# Patient Record
Sex: MALE | Race: BLACK OR AFRICAN AMERICAN | Employment: FULL TIME | URBAN - METROPOLITAN AREA
[De-identification: names, ages, dates, MRNs, and addresses within clinical notes are randomized per-mention and may not be internally consistent; named-entity substitution may affect disease eponyms.]

---

## 2020-11-23 ENCOUNTER — OFFICE VISIT (OUTPATIENT)
Dept: FAMILY MEDICINE CLINIC | Facility: CLINIC | Age: 35
End: 2020-11-23
Payer: COMMERCIAL

## 2020-11-23 VITALS
HEIGHT: 73.23 IN | DIASTOLIC BLOOD PRESSURE: 84 MMHG | SYSTOLIC BLOOD PRESSURE: 130 MMHG | HEART RATE: 74 BPM | WEIGHT: 315 LBS | RESPIRATION RATE: 18 BRPM | BODY MASS INDEX: 41.3 KG/M2

## 2020-11-23 DIAGNOSIS — I10 ESSENTIAL HYPERTENSION: ICD-10-CM

## 2020-11-23 DIAGNOSIS — Z00.00 ROUTINE PHYSICAL EXAMINATION: Primary | ICD-10-CM

## 2020-11-23 DIAGNOSIS — Z13.29 THYROID DISORDER SCREEN: ICD-10-CM

## 2020-11-23 DIAGNOSIS — E11.9 TYPE 2 DIABETES MELLITUS WITHOUT COMPLICATION, WITHOUT LONG-TERM CURRENT USE OF INSULIN (HCC): ICD-10-CM

## 2020-11-23 DIAGNOSIS — Z11.3 ROUTINE SCREENING FOR STI (SEXUALLY TRANSMITTED INFECTION): ICD-10-CM

## 2020-11-23 PROCEDURE — 99385 PREV VISIT NEW AGE 18-39: CPT | Performed by: FAMILY MEDICINE

## 2020-11-23 PROCEDURE — 3075F SYST BP GE 130 - 139MM HG: CPT | Performed by: FAMILY MEDICINE

## 2020-11-23 PROCEDURE — 3008F BODY MASS INDEX DOCD: CPT | Performed by: FAMILY MEDICINE

## 2020-11-23 PROCEDURE — 3079F DIAST BP 80-89 MM HG: CPT | Performed by: FAMILY MEDICINE

## 2020-11-23 RX ORDER — HYDROCHLOROTHIAZIDE 25 MG/1
25 TABLET ORAL DAILY
COMMUNITY
Start: 2020-10-28 | End: 2021-12-13

## 2020-11-23 RX ORDER — LOSARTAN POTASSIUM 100 MG/1
100 TABLET ORAL DAILY
COMMUNITY
Start: 2020-10-28 | End: 2021-12-13

## 2020-11-23 RX ORDER — SITAGLIPTIN 50 MG/1
50 TABLET, FILM COATED ORAL DAILY
COMMUNITY
Start: 2020-11-21 | End: 2021-12-13

## 2020-11-23 NOTE — PROGRESS NOTES
HPI:    Patient ID: Navin Jackson is a 38year old male.     47-year-old -American male here for complete preventive care physical and for status update on any confirmed chronic medical illnesses and follow up on any previous labs or procedures examination  Performed. 2. Routine screening for STI (sexually transmitted infection)  Screened by request; asymptomatic.  - T PALLIDUM SCREENING CASCADE  - CHLAMYDIA (NISSERIA) GONORRHOEAE DNA, SDA  - HEPATITIS PANEL, ACUTE (4)  - HIV AG AB COMBO    3.

## 2020-11-24 ENCOUNTER — TELEPHONE (OUTPATIENT)
Dept: INTERNAL MEDICINE CLINIC | Facility: CLINIC | Age: 35
End: 2020-11-24

## 2020-11-24 NOTE — TELEPHONE ENCOUNTER
FMLA forms dropped off at the [facility] office, scanned to the [employer] dept and placed in the [facility] forms box.

## 2020-11-25 NOTE — TELEPHONE ENCOUNTER
FMLA forms have not been received in Forms Dept.  Sent email to [facility] to send forms

## 2020-12-17 NOTE — TELEPHONE ENCOUNTER
Dr Michelle Garcia,    Pt seeking intermittent fmla for high blood pressure and diabetes. He is seeking 1-3 flare ups per month lasting a full day. He is seeking 1 appt every 2 or 3 months. Start date for fmla 12/17/2020.

## 2020-12-17 NOTE — TELEPHONE ENCOUNTER
Dr. Alta Romo,    **Pt seeking intermittent fmla for high blood pressure and diabetes. He is seeking 1-3 flare ups per month lasting a full day. He is seeking 1 appt every 2 or 3 months.  If you approve, please brii below**     Please sign off on form: Fmla

## 2021-02-28 NOTE — LETTER
6/6/2022              [address] [place] [address] [id number]         To Whom it may concern: This is to certify that Dipika Rene had an appointment on 6/6/2022 at 3:40 PM with Will Lewis DO.         Sincerely,          Will Lewis DO  [address] [facility], [address] [facility], [address] [facility]  [address] [facility]  [phone] Discharge Instructions    Discharged to home by car with relative. Discharged via wheelchair, hospital escort: Yes.  Special equipment needed: Not Applicable    Be sure to schedule a follow-up appointment with your primary care doctor or any specialists as instructed.     Discharge Plan:   Diet Plan: Discussed  Activity Level: Discussed  Confirmed Follow up Appointment: Patient to Call and Schedule Appointment  Confirmed Symptoms Management: Discussed  Medication Reconciliation Updated: Yes  Influenza Vaccine Indication: Not indicated: Previously immunized this influenza season and > 8 years of age    I understand that a diet low in cholesterol, fat, and sodium is recommended for good health. Unless I have been given specific instructions below for another diet, I accept this instruction as my diet prescription.   Other diet: Cardiac    Special Instructions: None    · Is patient discharged on Warfarin / Coumadin?   No     Depression / Suicide Risk    As you are discharged from this [facility] facility, it is important to learn how to keep safe from harming yourself.    Recognize the warning signs:  · Abrupt changes in personality, positive or negative- including increase in energy   · Giving away possessions  · Change in eating patterns- significant weight changes-  positive or negative  · Change in sleeping patterns- unable to sleep or sleeping all the time   · Unwillingness or inability to communicate  · Depression  · Unusual sadness, discouragement and loneliness  · Talk of wanting to die  · Neglect of personal appearance   · Rebelliousness- reckless behavior  · Withdrawal from people/activities they love  · Confusion- inability to concentrate     If you or a loved one observes any of these behaviors or has concerns about self-harm, here's what you can do:  · Talk about it- your feelings and reasons for harming yourself  · Remove any means that you might use to hurt yourself (examples: pills, rope, extension  "cords, firearm)  · Get professional help from the community (Mental Health, Substance Abuse, psychological counseling)  · Do not be alone:Call your Safe Contact- someone whom you trust who will be there for you.  · Call your local CRISIS HOTLINE 611-0157 or 697-022-8789  · Call your local Children's Mobile Crisis Response Team [place] (873) 824-5171 or www.Freebee  · Call the toll free National Suicide Prevention Hotlines   · National Suicide Prevention Lifeline 849-622-OLQJ (3921)  · [facility] Line Network 800-SUICIDE (044-9590)      Managing Your Hypertension  Hypertension is commonly called high blood pressure. This is when the force of your blood pressing against the walls of your arteries is too strong. Arteries are blood vessels that carry blood from your heart throughout your body. Hypertension forces the heart to work harder to pump blood, and may cause the arteries to become narrow or stiff. Having untreated or uncontrolled hypertension can cause heart attack, stroke, kidney disease, and other problems.  What are blood pressure readings?  A blood pressure reading consists of a higher number over a lower number. Ideally, your blood pressure should be below 120/80. The first (\"top\") number is called the systolic pressure. It is a measure of the pressure in your arteries as your heart beats. The second (\"bottom\") number is called the diastolic pressure. It is a measure of the pressure in your arteries as the heart relaxes.  What does my blood pressure reading mean?  Blood pressure is classified into four stages. Based on your blood pressure reading, your health care provider may use the following stages to determine what type of treatment you need, if any. Systolic pressure and diastolic pressure are measured in a unit called mm Hg.  Normal  · Systolic pressure: below 120.  · Diastolic pressure: below 80.  Elevated  · Systolic pressure: 120-129.  · Diastolic pressure: below 80.  Hypertension " stage 1  · Systolic pressure: 130-139.  · Diastolic pressure: 80-89.  Hypertension stage 2  · Systolic pressure: 140 or above.  · Diastolic pressure: 90 or above.  What health risks are associated with hypertension?  Managing your hypertension is an important responsibility. Uncontrolled hypertension can lead to:  · A heart attack.  · A stroke.  · A weakened blood vessel (aneurysm).  · Heart failure.  · Kidney damage.  · Eye damage.  · Metabolic syndrome.  · Memory and concentration problems.  What changes can I make to manage my hypertension?  Hypertension can be managed by making lifestyle changes and possibly by taking medicines. Your health care provider will help you make a plan to bring your blood pressure within a normal range.  Eating and drinking    · Eat a diet that is high in fiber and potassium, and low in salt (sodium), added sugar, and fat. An example eating plan is called the DASH (Dietary Approaches to Stop Hypertension) diet. To eat this way:  ? Eat plenty of fresh fruits and vegetables. Try to fill half of your plate at each meal with fruits and vegetables.  ? Eat whole grains, such as whole wheat pasta, brown rice, or whole grain bread. Fill about one quarter of your plate with whole grains.  ? Eat low-fat diary products.  ? Avoid fatty cuts of meat, processed or cured meats, and poultry with skin. Fill about one quarter of your plate with lean proteins such as fish, chicken without skin, beans, eggs, and tofu.  ? Avoid premade and processed foods. These tend to be higher in sodium, added sugar, and fat.  · Reduce your daily sodium intake. Most people with hypertension should eat less than 1,500 mg of sodium a day.  · Limit alcohol intake to no more than 1 drink a day for nonpregnant women and 2 drinks a day for men. One drink equals 12 oz of beer, 5 oz of wine, or 1½ oz of hard liquor.  Lifestyle  · Work with your health care provider to maintain a healthy body weight, or to lose weight. Ask what  an ideal weight is for you.  · Get at least 30 minutes of exercise that causes your heart to beat faster (aerobic exercise) most days of the week. Activities may include walking, swimming, or biking.  · Include exercise to strengthen your muscles (resistance exercise), such as weight lifting, as part of your weekly exercise routine. Try to do these types of exercises for 30 minutes at least 3 days a week.  · Do not use any products that contain nicotine or tobacco, such as cigarettes and e-cigarettes. If you need help quitting, ask your health care provider.  · Control any long-term (chronic) conditions you have, such as high cholesterol or diabetes.  Monitoring  · Monitor your blood pressure at home as told by your health care provider. Your personal target blood pressure may vary depending on your medical conditions, your age, and other factors.  · Have your blood pressure checked regularly, as often as told by your health care provider.  Working with your health care provider  · Review all the medicines you take with your health care provider because there may be side effects or interactions.  · Talk with your health care provider about your diet, exercise habits, and other lifestyle factors that may be contributing to hypertension.  · Visit your health care provider regularly. Your health care provider can help you create and adjust your plan for managing hypertension.  Will I need medicine to control my blood pressure?  Your health care provider may prescribe medicine if lifestyle changes are not enough to get your blood pressure under control, and if:  · Your systolic blood pressure is 130 or higher.  · Your diastolic blood pressure is 80 or higher.  Take medicines only as told by your health care provider. Follow the directions carefully. Blood pressure medicines must be taken as prescribed. The medicine does not work as well when you skip doses. Skipping doses also puts you at risk for problems.  Contact a  health care provider if:  · You think you are having a reaction to medicines you have taken.  · You have repeated (recurrent) headaches.  · You feel dizzy.  · You have swelling in your ankles.  · You have trouble with your vision.  Get help right away if:  · You develop a severe headache or confusion.  · You have unusual weakness or numbness, or you feel faint.  · You have severe pain in your chest or abdomen.  · You vomit repeatedly.  · You have trouble breathing.  Summary  · Hypertension is when the force of blood pumping through your arteries is too strong. If this condition is not controlled, it may put you at risk for serious complications.  · Your personal target blood pressure may vary depending on your medical conditions, your age, and other factors. For most people, a normal blood pressure is less than 120/80.  · Hypertension is managed by lifestyle changes, medicines, or both. Lifestyle changes include weight loss, eating a healthy, low-sodium diet, exercising more, and limiting alcohol.  This information is not intended to replace advice given to you by your health care provider. Make sure you discuss any questions you have with your health care provider.  Document Released: 09/11/2013 Document Revised: 04/10/2020 Document Reviewed: 11/15/2017  Elsevier Patient Education © 2020 [employer] Inc[employer]

## 2021-03-18 DIAGNOSIS — Z23 NEED FOR VACCINATION: ICD-10-CM

## 2021-12-13 ENCOUNTER — PATIENT MESSAGE (OUTPATIENT)
Dept: FAMILY MEDICINE CLINIC | Facility: CLINIC | Age: 36
End: 2021-12-13

## 2021-12-13 RX ORDER — LOSARTAN POTASSIUM 100 MG/1
100 TABLET ORAL DAILY
Qty: 90 TABLET | Refills: 1 | Status: SHIPPED | OUTPATIENT
Start: 2021-12-13

## 2021-12-13 RX ORDER — SITAGLIPTIN 50 MG/1
50 TABLET, FILM COATED ORAL DAILY
Qty: 90 TABLET | Refills: 1 | Status: SHIPPED | OUTPATIENT
Start: 2021-12-13

## 2021-12-13 RX ORDER — HYDROCHLOROTHIAZIDE 25 MG/1
25 TABLET ORAL DAILY
Qty: 90 TABLET | Refills: 1 | Status: SHIPPED | OUTPATIENT
Start: 2021-12-13

## 2021-12-13 NOTE — TELEPHONE ENCOUNTER
Hypertensive Medications  Protocol Criteria:  · Appointment scheduled in the past 6 months or in the next 3 months  · BMP or CMP in the past 12 months  · Creatinine result < 2  Recent Outpatient Visits            1 year ago Routine physical examination

## 2021-12-13 NOTE — TELEPHONE ENCOUNTER
Left message to call back. All rx printed. Attempted to contact patient to inform if she would want rx's mailed and or faxed to pharmacy.

## 2021-12-13 NOTE — TELEPHONE ENCOUNTER
From: Janak Alejandra  To: Theresa Henning DO  Sent: 12/13/2021 8:34 AM CST  Subject: Refill request     Jose Diaz, I currently need a refill on my medications.

## 2022-05-27 ENCOUNTER — TELEPHONE (OUTPATIENT)
Dept: FAMILY MEDICINE CLINIC | Facility: CLINIC | Age: 37
End: 2022-05-27

## 2022-06-06 ENCOUNTER — OFFICE VISIT (OUTPATIENT)
Dept: FAMILY MEDICINE CLINIC | Facility: CLINIC | Age: 37
End: 2022-06-06
Payer: COMMERCIAL

## 2022-06-06 VITALS
WEIGHT: 314.5 LBS | BODY MASS INDEX: 41.23 KG/M2 | HEIGHT: 73.25 IN | DIASTOLIC BLOOD PRESSURE: 100 MMHG | SYSTOLIC BLOOD PRESSURE: 150 MMHG | HEART RATE: 98 BPM

## 2022-06-06 DIAGNOSIS — Z00.00 ROUTINE PHYSICAL EXAMINATION: Primary | ICD-10-CM

## 2022-06-06 DIAGNOSIS — Z13.29 THYROID DISORDER SCREEN: ICD-10-CM

## 2022-06-06 DIAGNOSIS — I10 ESSENTIAL HYPERTENSION: ICD-10-CM

## 2022-06-06 DIAGNOSIS — E11.9 TYPE 2 DIABETES MELLITUS WITHOUT COMPLICATION, WITHOUT LONG-TERM CURRENT USE OF INSULIN (HCC): ICD-10-CM

## 2022-06-06 DIAGNOSIS — E66.01 MORBID OBESITY WITH BMI OF 40.0-44.9, ADULT (HCC): ICD-10-CM

## 2022-06-06 RX ORDER — LOSARTAN POTASSIUM 100 MG/1
100 TABLET ORAL DAILY
Qty: 90 TABLET | Refills: 1 | Status: SHIPPED | OUTPATIENT
Start: 2022-06-06

## 2022-06-06 RX ORDER — HYDROCHLOROTHIAZIDE 25 MG/1
25 TABLET ORAL DAILY
Qty: 90 TABLET | Refills: 1 | Status: SHIPPED | OUTPATIENT
Start: 2022-06-06

## 2022-06-06 RX ORDER — SITAGLIPTIN 50 MG/1
50 TABLET, FILM COATED ORAL DAILY
Qty: 90 TABLET | Refills: 1 | Status: SHIPPED | OUTPATIENT
Start: 2022-06-06

## 2022-06-06 NOTE — PATIENT INSTRUCTIONS
All adult screening ordered and done appropriate for patient's age and gender and risk factors and complaints. Recommend weight loss via daily exercising and consistent healthy dietary changes. Encouraged physical fitness and daily physical activity daily. Comply with medications. Diabetic status update via blood and urine. Patient would also benefit from ophthalmology evaluation just to make sure that there are no damaging effects from the diabetes regarding his eyes. FMLA continuation regarding diabetes.

## 2022-06-08 DIAGNOSIS — E11.9 TYPE 2 DIABETES MELLITUS WITHOUT COMPLICATION, WITHOUT LONG-TERM CURRENT USE OF INSULIN (HCC): ICD-10-CM

## 2022-06-08 DIAGNOSIS — I10 ESSENTIAL HYPERTENSION: ICD-10-CM

## 2022-06-08 DIAGNOSIS — R94.31 ABNORMAL EKG: Primary | ICD-10-CM

## 2022-06-08 NOTE — TELEPHONE ENCOUNTER
Dr. Darcy Zamorano     Please sign off on form: FMLA  -Highlight the patient and hit \"Chart\" button. -In Chart Review, w/in the Encounter tab - click 1 time on the Telephone call encounter for 5/27/22 Scroll down the telephone encounter.  -Click \"scan on\" blue Hyperlink under \"Media\" heading for FMLA Dr Darcy Zamorano 6/7/22  w/in the telephone enc.  -Click on Acknowledge button at the bottom right corner and left-click onto image, signature stamp appears and drag signature to Provider signature line. Stamp will turn blue. Close window.      Thank you,    Mackenzie Craig

## 2022-10-06 ENCOUNTER — MED REC SCAN ONLY (OUTPATIENT)
Dept: INTERNAL MEDICINE CLINIC | Facility: CLINIC | Age: 37
End: 2022-10-06

## 2022-10-28 ENCOUNTER — E-VISIT (OUTPATIENT)
Dept: TELEHEALTH | Age: 37
End: 2022-10-28

## 2022-10-28 DIAGNOSIS — M54.50 ACUTE LOW BACK PAIN, UNSPECIFIED BACK PAIN LATERALITY, UNSPECIFIED WHETHER SCIATICA PRESENT: Primary | ICD-10-CM

## 2023-02-22 PROCEDURE — 3061F NEG MICROALBUMINURIA REV: CPT | Performed by: FAMILY MEDICINE

## 2023-02-23 DIAGNOSIS — E66.01 MORBID OBESITY WITH BMI OF 40.0-44.9, ADULT (HCC): ICD-10-CM

## 2023-02-23 DIAGNOSIS — E13.649 UNCONTROLLED OTHER SPECIFIED DIABETES MELLITUS WITH HYPOGLYCEMIA WITHOUT COMA (HCC): Primary | ICD-10-CM

## 2023-02-23 DIAGNOSIS — I10 PRIMARY HYPERTENSION: ICD-10-CM

## 2023-02-23 LAB
ABSOLUTE BASOPHILS: 21 CELLS/UL (ref 0–200)
ABSOLUTE EOSINOPHILS: 178 CELLS/UL (ref 15–500)
ABSOLUTE LYMPHOCYTES: 1349 CELLS/UL (ref 850–3900)
ABSOLUTE MONOCYTES: 490 CELLS/UL (ref 200–950)
ABSOLUTE NEUTROPHILS: 5062 CELLS/UL (ref 1500–7800)
ALBUMIN/GLOBULIN RATIO: 1.3 (CALC) (ref 1–2.5)
ALBUMIN: 4.3 G/DL (ref 3.6–5.1)
ALKALINE PHOSPHATASE: 93 U/L (ref 36–130)
ALT: 38 U/L (ref 9–46)
AST: 70 U/L (ref 10–40)
BASOPHILS: 0.3 %
BILIRUBIN, TOTAL: 1 MG/DL (ref 0.2–1.2)
BUN: 10 MG/DL (ref 7–25)
CALCIUM: 10.4 MG/DL (ref 8.6–10.3)
CARBON DIOXIDE: 29 MMOL/L (ref 20–32)
CHLORIDE: 96 MMOL/L (ref 98–110)
CHOL/HDLC RATIO: 5.3 (CALC)
CHOLESTEROL, TOTAL: 200 MG/DL
CREATININE, RANDOM URINE: 216 MG/DL (ref 20–320)
CREATININE: 0.95 MG/DL (ref 0.6–1.26)
EGFR: 104 ML/MIN/1.73M2
EOSINOPHILS: 2.5 %
GLOBULIN: 3.3 G/DL (CALC) (ref 1.9–3.7)
GLUCOSE: 128 MG/DL (ref 65–99)
HDL CHOLESTEROL: 38 MG/DL
HEMATOCRIT: 46.6 % (ref 38.5–50)
HEMOGLOBIN A1C: 9.4 % OF TOTAL HGB
HEMOGLOBIN: 16.3 G/DL (ref 13.2–17.1)
LDL-CHOLESTEROL: 135 MG/DL (CALC)
LYMPHOCYTES: 19 %
MCH: 33.1 PG (ref 27–33)
MCHC: 35 G/DL (ref 32–36)
MCV: 94.7 FL (ref 80–100)
MICROALBUMIN/CREATININE RATIO, RANDOM URINE: 5 MCG/MG CREAT
MICROALBUMIN: 1 MG/DL
MONOCYTES: 6.9 %
MPV: 11 FL (ref 7.5–12.5)
NEUTROPHILS: 71.3 %
NON-HDL CHOLESTEROL: 162 MG/DL (CALC)
PLATELET COUNT: 161 THOUSAND/UL (ref 140–400)
POTASSIUM: 4.3 MMOL/L (ref 3.5–5.3)
PROTEIN, TOTAL: 7.6 G/DL (ref 6.1–8.1)
RDW: 12.3 % (ref 11–15)
RED BLOOD CELL COUNT: 4.92 MILLION/UL (ref 4.2–5.8)
SODIUM: 136 MMOL/L (ref 135–146)
TRIGLYCERIDES: 144 MG/DL
WHITE BLOOD CELL COUNT: 7.1 THOUSAND/UL (ref 3.8–10.8)

## 2023-03-15 DIAGNOSIS — E11.9 TYPE 2 DIABETES MELLITUS WITHOUT COMPLICATION, WITHOUT LONG-TERM CURRENT USE OF INSULIN (HCC): ICD-10-CM

## 2023-03-15 NOTE — TELEPHONE ENCOUNTER
Please review. Protocol Failed or has No Protocol.     Requested Prescriptions   Pending Prescriptions Disp Refills    METFORMIN 500 MG Oral Tab [Pharmacy Med Name: METFORMIN  MG TABLET] 60 tablet 2     Sig: TAKE 1 TABLET BY MOUTH TWICE A DAY       Diabetes Medication Protocol Failed - 3/15/2023  1:31 PM        Failed - Last A1C < 7.5 and within past 6 months     Lab Results   Component Value Date    A1C 9.4 (H) 02/22/2023             Failed - In person appointment or virtual visit in the past 6 mos or appointment in next 3 mos     Recent Outpatient Visits              4 months ago Acute low back pain, unspecified back pain laterality, unspecified whether sciatica present    [address], Virtual Visit BRITTANY Clemons    E-Visit    9 months ago Routine physical examination    [address], [facility], [place], [place]    Office Visit    2 years ago Routine physical examination    Ketan Romero [place], [place]    Office Visit                      Failed - GFR in the past 12 months        Passed - EGFRCR or GFRAA > 50     GFR Evaluation                     Recent Outpatient Visits              4 months ago Acute low back pain, unspecified back pain laterality, unspecified whether sciatica present    [address], Virtual Visit BRITTANY Clemons    E-Visit    9 months ago Routine physical examination    [address], [facility], [place], [place],     Office Visit    2 years ago Routine physical examination    [address], [facility], [place], [place]    Office Visit

## 2023-03-29 ENCOUNTER — TELEPHONE (OUTPATIENT)
Dept: FAMILY MEDICINE CLINIC | Facility: CLINIC | Age: 38
End: 2023-03-29

## 2023-05-18 ENCOUNTER — TELEPHONE (OUTPATIENT)
Dept: FAMILY MEDICINE CLINIC | Facility: CLINIC | Age: 38
End: 2023-05-18

## 2023-05-28 DIAGNOSIS — E11.9 TYPE 2 DIABETES MELLITUS WITHOUT COMPLICATION, WITHOUT LONG-TERM CURRENT USE OF INSULIN (HCC): ICD-10-CM

## 2023-05-29 RX ORDER — SITAGLIPTIN 50 MG/1
50 TABLET, FILM COATED ORAL DAILY
Qty: 90 TABLET | Refills: 1 | Status: SHIPPED | OUTPATIENT
Start: 2023-05-29

## 2023-05-29 NOTE — TELEPHONE ENCOUNTER
Please review. Protocol Failed or has No Protocol.     Requested Prescriptions   Pending Prescriptions Disp Refills    JANUVIA 50 MG Oral Tab [Pharmacy Med Name: Shirlene Fish 50 MG TABLET] 90 tablet 1     Sig: TAKE 1 TABLET BY MOUTH EVERY DAY       Diabetes Medication Protocol Failed - 5/28/2023  8:04 AM        Failed - Last A1C < 7.5 and within past 6 months     Lab Results   Component Value Date    A1C 9.4 (H) 02/22/2023               Passed - In person appointment or virtual visit in the past 6 mos or appointment in next 3 mos     Recent Outpatient Visits              7 months ago Acute low back pain, unspecified back pain laterality, unspecified whether sciatica present    lEa Bell, Virtual Visit BRITTANY Danielson    E-Visit    11 months ago Routine physical examination    Florentin Ca, Alma Guerra,     Office Visit    2 years ago Routine physical examination    Florentin Ca, Alma Guerra, [place]    Office Visit          Future Appointments         Provider Department Appt Notes    In 1 week DO Ela Leija [address], [address] or GFRAA > 50     GFR Evaluation              Passed - GFR in the past 12 months             Future Appointments         Provider Department Appt Notes    In 1 week DO Clem Leija, [facility] Outpatient Visits              7 months ago Acute low back pain, unspecified back pain laterality, unspecified whether sciatica present    Ela Bell, Virtual Visit BRITTANY Danielson    E-Visit    11 months ago Routine physical examination    [address], [place], Alma Guerra, DO    Office Visit    2 years ago Routine physical examination    Ela Bell, [address] [place] [address] James Leiva, Amina Resendez, DO    Office Visit

## 2023-06-08 ENCOUNTER — TELEPHONE (OUTPATIENT)
Dept: FAMILY MEDICINE CLINIC | Facility: CLINIC | Age: 38
End: 2023-06-08

## 2023-06-08 ENCOUNTER — OFFICE VISIT (OUTPATIENT)
Dept: FAMILY MEDICINE CLINIC | Facility: CLINIC | Age: 38
End: 2023-06-08

## 2023-06-08 VITALS
BODY MASS INDEX: 41.3 KG/M2 | TEMPERATURE: 98 F | DIASTOLIC BLOOD PRESSURE: 85 MMHG | WEIGHT: 315 LBS | HEIGHT: 73.25 IN | HEART RATE: 80 BPM | SYSTOLIC BLOOD PRESSURE: 110 MMHG

## 2023-06-08 DIAGNOSIS — E11.9 DIABETIC EYE EXAM (HCC): Primary | ICD-10-CM

## 2023-06-08 DIAGNOSIS — E66.01 MORBID OBESITY WITH BMI OF 40.0-44.9, ADULT (HCC): ICD-10-CM

## 2023-06-08 DIAGNOSIS — I10 ESSENTIAL HYPERTENSION: ICD-10-CM

## 2023-06-08 DIAGNOSIS — E11.9 TYPE 2 DIABETES MELLITUS WITHOUT COMPLICATION, WITHOUT LONG-TERM CURRENT USE OF INSULIN (HCC): ICD-10-CM

## 2023-06-08 DIAGNOSIS — Z01.00 DIABETIC EYE EXAM (HCC): Primary | ICD-10-CM

## 2023-06-08 LAB
CARTRIDGE LOT#: 595 NUMERIC
HEMOGLOBIN A1C: 10 % (ref 4.3–5.6)

## 2023-06-08 PROCEDURE — 3008F BODY MASS INDEX DOCD: CPT | Performed by: FAMILY MEDICINE

## 2023-06-08 PROCEDURE — 99214 OFFICE O/P EST MOD 30 MIN: CPT | Performed by: FAMILY MEDICINE

## 2023-06-08 PROCEDURE — 3074F SYST BP LT 130 MM HG: CPT | Performed by: FAMILY MEDICINE

## 2023-06-08 PROCEDURE — 3046F HEMOGLOBIN A1C LEVEL >9.0%: CPT | Performed by: FAMILY MEDICINE

## 2023-06-08 PROCEDURE — 3079F DIAST BP 80-89 MM HG: CPT | Performed by: FAMILY MEDICINE

## 2023-06-08 PROCEDURE — 90471 IMMUNIZATION ADMIN: CPT | Performed by: FAMILY MEDICINE

## 2023-06-08 PROCEDURE — 90677 PCV20 VACCINE IM: CPT | Performed by: FAMILY MEDICINE

## 2023-06-08 PROCEDURE — 83036 HEMOGLOBIN GLYCOSYLATED A1C: CPT | Performed by: FAMILY MEDICINE

## 2023-06-08 NOTE — PATIENT INSTRUCTIONS
Recommend weight loss via daily exercising and consistent healthy dietary changes. Encouraged physical fitness and daily physical activity daily. Medication reviewed and renewed where needed and appropriate. Comply with medications. Monitor blood pressures and record at home. Limit salt intake. Patient being referred to endocrinology for optimization of diabetic treatment. FMLA to be updated regarding hypertensive/diabetic chronic medical illness. Patient has been approved for the initial allotment provided on the initial FMLA form.

## 2023-06-08 NOTE — TELEPHONE ENCOUNTER
Patient came in for Dr encarnacion and to drop off FMLA forms and needs to be billed the $25 fee. Forms were sent to forms department at [email or web address] and originals where kept at [facility] location.

## 2023-06-08 NOTE — TELEPHONE ENCOUNTER
Fmla received in forms dept. Logged for processing. Valid [facility] auth received with forms.

## 2023-06-14 DIAGNOSIS — I10 ESSENTIAL HYPERTENSION: ICD-10-CM

## 2023-06-15 RX ORDER — LOSARTAN POTASSIUM 100 MG/1
100 TABLET ORAL DAILY
Qty: 90 TABLET | Refills: 3 | Status: SHIPPED | OUTPATIENT
Start: 2023-06-15

## 2023-06-15 RX ORDER — HYDROCHLOROTHIAZIDE 25 MG/1
25 TABLET ORAL DAILY
Qty: 90 TABLET | Refills: 3 | Status: SHIPPED | OUTPATIENT
Start: 2023-06-15

## 2023-06-15 NOTE — TELEPHONE ENCOUNTER
Refill passed per [facility] protocol. Requested Prescriptions   Pending Prescriptions Disp Refills    HYDROCHLOROTHIAZIDE 25 MG Oral Tab [Pharmacy Med Name: HYDROCHLOROTHIAZIDE 25 MG TAB] 90 tablet 1     Sig: Take 1 tablet (25 mg total) by mouth daily. Hypertensive Medications Protocol Passed - 6/14/2023 11:01 AM        Passed - In person appointment in the past 12 or next 3 months     Recent Outpatient Visits              1 week ago Diabetic eye exam [facility])    [facility]    [facility] Visit    7 months ago Acute low back pain, unspecified back pain laterality, unspecified whether sciatica present    [address], Virtual Visit BRITTANY Warner    E-Visit    1 year ago Routine physical examination    Florentin Orona Tally Reges,     Office Visit    2 years ago Routine physical examination    Florentin Orona Tally Reges [place]    Office Visit                      Passed - Last BP reading less than 140/90     BP Readings from Last 1 Encounters:  06/08/23 : 110/85              Passed - CMP or BMP in past 6 months     Recent Results (from the past 4392 hour(s))   COMP METABOLIC PANEL (14)    Collection Time: 02/22/23  6:39 AM   Result Value Ref Range    GLUCOSE 128 (H) 65 - 99 mg/dL     Comment:               Fasting reference interval     For someone without known diabetes, a glucose  value >125 mg/dL indicates that they may have  diabetes and this should be confirmed with a  follow-up test.         UREA NITROGEN (BUN) 10 7 - 25 mg/dL    CREATININE 0.95 0.60 - 1.26 mg/dL    EGFR 104 > OR = 60 mL/min/1.73m2     Comment: The eGFR is based on the CKD-EPI 2021 equation. To calculate   the new eGFR from a previous Creatinine or Cystatin C  result, go to [employer]. org/professionals/  kdoqi/[email or web address]      BUN/CREATININE RATIO NOT APPLICABLE 6 - 22 (calc)    SODIUM 136 135 - 146 mmol/L    POTASSIUM 4.3 3.5 - 5.3 mmol/L    CHLORIDE 96 (L) 98 - 110 mmol/L    CARBON DIOXIDE 29 20 - 32 mmol/L    CALCIUM 10.4 (H) 8.6 - 10.3 mg/dL    PROTEIN, TOTAL 7.6 6.1 - 8.1 g/dL    ALBUMIN 4.3 3.6 - 5.1 g/dL    GLOBULIN 3.3 1.9 - 3.7 g/dL (calc)    ALBUMIN/GLOBULIN RATIO 1.3 1.0 - 2.5 (calc)    BILIRUBIN, TOTAL 1.0 0.2 - 1.2 mg/dL    ALKALINE PHOSPHATASE 93 36 - 130 U/L    AST 70 (H) 10 - 40 U/L    ALT 38 9 - 46 U/L     *Note: Due to a large number of results and/or encounters for the requested time period, some results have not been displayed. A complete set of results can be found in Results Review.                Passed - In person appointment or virtual visit in the past 6 months     Recent Outpatient Visits              1 week ago Diabetic eye exam [facility])    Florentin Cuellar Christyne Actis [place]    Office Visit    7 months ago Acute low back pain, unspecified back pain laterality, unspecified whether sciatica present    [address], Virtual Visit BRITTANY Adams    E-Visit    1 year ago Routine physical examination    Florentin Cuellar Christyne Actis,     Office Visit    2 years ago Routine physical examination    Florentin Cuellar Christyne Actis, DO    Office Visit                      Passed - EGFRCR or GFRAA > 50     GFR Evaluation              LOSARTAN 100 MG Oral Tab [Pharmacy Med Name: LOSARTAN POTASSIUM 100 MG TAB] 90 tablet 1     Sig: TAKE 1 TABLET BY MOUTH EVERY DAY       Hypertensive Medications Protocol Passed - 6/14/2023 11:01 AM        Passed - In person appointment in the past 12 or next 3 months     Recent Outpatient Visits              1 week ago Diabetic eye exam [facility])    Florentin Cuellar [place]    Office Visit    7 months ago Acute low back pain, unspecified back pain laterality, unspecified whether sciatica present    [address] [place], Virtual Visit Margareth Jesus Alberto BRITTANY    E-Visit    1 year ago Routine physical examination    [address], [place]    Office Visit    2 years ago Routine physical examination    [address], [place]    Office Visit                      Passed - Last BP reading less than 140/90     BP Readings from Last 1 Encounters:  06/08/23 : 110/85              Passed - CMP or BMP in past 6 months     Recent Results (from the past 4392 hour(s))   COMP METABOLIC PANEL (14)    Collection Time: 02/22/23  6:39 AM   Result Value Ref Range    GLUCOSE 128 (H) 65 - 99 mg/dL     Comment:               Fasting reference interval     For someone without known diabetes, a glucose  value >125 mg/dL indicates that they may have  diabetes and this should be confirmed with a  follow-up test.         UREA NITROGEN (BUN) 10 7 - 25 mg/dL    CREATININE 0.95 0.60 - 1.26 mg/dL    EGFR 104 > OR = 60 mL/min/1.73m2     Comment: The eGFR is based on the CKD-EPI 2021 equation. To calculate   the new eGFR from a previous Creatinine or Cystatin C  result, go to [employer]. org/professionals/  kdoqi/gfr%5Fcalculator      BUN/CREATININE RATIO NOT APPLICABLE 6 - 22 (calc)    SODIUM 136 135 - 146 mmol/L    POTASSIUM 4.3 3.5 - 5.3 mmol/L    CHLORIDE 96 (L) 98 - 110 mmol/L    CARBON DIOXIDE 29 20 - 32 mmol/L    CALCIUM 10.4 (H) 8.6 - 10.3 mg/dL    PROTEIN, TOTAL 7.6 6.1 - 8.1 g/dL    ALBUMIN 4.3 3.6 - 5.1 g/dL    GLOBULIN 3.3 1.9 - 3.7 g/dL (calc)    ALBUMIN/GLOBULIN RATIO 1.3 1.0 - 2.5 (calc)    BILIRUBIN, TOTAL 1.0 0.2 - 1.2 mg/dL    ALKALINE PHOSPHATASE 93 36 - 130 U/L    AST 70 (H) 10 - 40 U/L    ALT 38 9 - 46 U/L     *Note: Due to a large number of results and/or encounters for the requested time period, some results have not been displayed. A complete set of results can be found in Results Review.                Passed - In person appointment or virtual visit in the past 6 months     Recent Outpatient Visits              1 week ago Diabetic eye exam [facility])    [facility] [place], [place]    Office Visit    7 months ago Acute low back pain, unspecified back pain laterality, unspecified whether sciatica present    [employer], Virtual Visit BRITTANY Borges    E-Visit    1 year ago [facility] physical [facility]    [facility]    2 years ago Routine physical examination    [facility] [place], [address] [place] Final or GFRAA > 50     GFR Evaluation                 Recent Outpatient Visits              1 week ago Diabetic eye exam [facility])    [facility], [place], [place]    Office Visit    7 months ago Acute low back pain, unspecified back pain laterality, unspecified whether sciatica present    [employer], Virtual Visit BRITTANY Borges    E-Visit    1 year ago Routine physical examination    [facility] [place]    Office Visit    2 years ago Routine physical examination    [facility]    Office Visit

## 2023-06-22 NOTE — TELEPHONE ENCOUNTER
Dr Dipti Martinez,    Pt is seeking intermittent fmla due to high blood pressure and diabetes. Pt is seeking 1-5 flare ups a month, each flare up lasting 1 day. Do you support?     Thanks,     Luda Pac

## 2023-06-23 NOTE — TELEPHONE ENCOUNTER
----- Message from Librado Smart MD sent at 7/28/2021  6:39 AM CDT -----  LFTs somewhat improved though still concerning.  Most GI labs still pending at this time though awaiting their recommendations pending results.  Continue to hold cholesterol medication, avoid tylenol, alcohol in particular.   Yes I support this. Thank you.

## 2023-07-03 NOTE — TELEPHONE ENCOUNTER
Dr. Cali Blount     Please sign off on form if you agree to: FMLA due to high blood pressure and diabetes   (Please place your signature on the first page only)    -From your Inbasket, Highlight the patient and click Chart   -Double click the 08/36/4102 Forms Completion telephone encounter  -Scroll down to the Media section   -Click the blue Hyperlink: FMLA Dr Cali Blount [id number]/[id number]  -Click Acknowledge located at the bottom right corner (if you do not see acknowledge, try maximizing your window)   -Drag the mouse into the blank space of the document and a + sign will appear. Left click to   electronically sign the document.      Thank you,    Leah Garay

## 2023-07-05 NOTE — TELEPHONE ENCOUNTER
FMLA forms faxed to [employer] [phone]. Confirmation received, [employer] message sent.

## 2023-09-11 DIAGNOSIS — E11.9 TYPE 2 DIABETES MELLITUS WITHOUT COMPLICATION, WITHOUT LONG-TERM CURRENT USE OF INSULIN (HCC): ICD-10-CM

## 2023-09-11 NOTE — TELEPHONE ENCOUNTER
Please review. Protocol failed / Has no protocol.      Requested Prescriptions   Pending Prescriptions Disp Refills    METFORMIN 500 MG Oral Tab [Pharmacy Med Name: METFORMIN  MG TABLET] 180 tablet 0     Sig: TAKE 1 TABLET BY MOUTH TWICE A DAY       Diabetes Medication Protocol Failed - 9/11/2023 12:54 AM        Failed - Last A1C < 7.5 and within past 6 months     Lab Results   Component Value Date    A1C 10.0 (A) 06/08/2023             Passed - In person appointment or virtual visit in the past 6 mos or appointment in next 3 mos     Recent Outpatient Visits              3 months ago Diabetic eye exam [facility])    [address] [facility], [address], [place]    Office Visit    10 months ago Acute low back pain, unspecified back pain laterality, unspecified whether sciatica present    [address], Virtual Visit BRITTANY Valentine    E-Visit    1 year ago Routine physical examination    [address] [facility], [address], [place]    Office Visit    2 years ago Routine physical examination    [address] [place] [address], [address], [place]    Office Visit                      Passed - EGFRCR or GFRAA > 50     GFR Evaluation  EGFRCR: 104 , resulted on 2/22/2023          Passed - GFR in the past 12 months              Recent Outpatient Visits              3 months ago Diabetic eye exam [facility])    [address] [facility], [address], [place]    Office Visit    10 months ago Acute low back pain, unspecified back pain laterality, unspecified whether sciatica present    [address], Virtual Visit BRITTANY Valentine    E-Visit    1 year ago Routine physical examination    [address], [address], [address], [place]    Office Visit    2 years ago Routine physical examination    [address], [address] [place] [address] Mikhail Dean, Susanne Solomon, DO    Office Visit

## 2023-10-20 ENCOUNTER — TELEPHONE (OUTPATIENT)
Dept: ENDOCRINOLOGY | Facility: HOSPITAL | Age: 38
End: 2023-10-20

## 2024-05-04 DIAGNOSIS — E11.9 TYPE 2 DIABETES MELLITUS WITHOUT COMPLICATION, WITHOUT LONG-TERM CURRENT USE OF INSULIN (HCC): ICD-10-CM

## 2024-05-06 NOTE — TELEPHONE ENCOUNTER
Please review; protocol failed/No Protocol    Last Office Visit: 06/08/2023  Sent [employer] message to patient to schedule an appointment.     No Active/Future labs pended.     Requested Prescriptions   Pending Prescriptions Disp Refills    METFORMIN 500 MG Oral Tab [Pharmacy Med Name: METFORMIN  MG TABLET] 180 tablet 0     Sig: TAKE 1 TABLET BY MOUTH TWICE A DAY       Diabetes Medication Protocol Failed - 5/4/2024 10:42 AM        Failed - Last A1C < 7.5 and within past 6 months     Lab Results   Component Value Date    A1C 10.0 (A) 06/08/2023             Failed - In person appointment or virtual visit in the past 6 mos or appointment in next 3 mos     Recent Outpatient Visits              11 months ago Diabetic eye exam (HCC)    [facility], [facility] [place] [place], [place]    Office Visit    1 year ago Acute low back pain, unspecified back pain laterality, unspecified whether sciatica present    [facility], Virtual Visit Loren Oneill APRN    E-Visit    1 year ago Routine physical examination    [facility] [place] [place], [place]    Office Visit    3 years ago Routine physical examination    [facility], [facility] [place] Francis Rose, DO    Office Visit                      Failed - EGFRCR or GFRAA > 50     GFR Evaluation            Failed - GFR in the past 12 months        Passed - Microalbumin procedure in past 12 months or taking ACE/ARB          JANUVIA 50 MG Oral Tab [Pharmacy Med Name: JANUVIA 50 MG TABLET] 90 tablet 1     Sig: TAKE 1 TABLET BY MOUTH EVERY DAY       Diabetes Medication Protocol Failed - 5/4/2024 10:42 AM        Failed - Last A1C < 7.5 and within past 6 months     Lab Results   Component Value Date    A1C 10.0 (A) 06/08/2023             Failed - In person appointment or virtual visit in the past 6 mos or appointment in next 3 mos     Recent Outpatient Visits               11 months ago Diabetic eye exam (HCC)    [facility], [facility] Francis Rose, DO    Office Visit    1 year ago Acute low back pain, unspecified back pain laterality, unspecified whether sciatica present    [facility], Virtual Visit Loren Oneill APRN    E-Visit    1 year ago Routine physical examination    [facility], [facility] Francis Rose, DO    Office Visit    3 years ago Routine physical examination    [facility], [facility] [place] Francis Rose, DO    Office Visit                      Failed - EGFRCR or GFRAA > 50     GFR Evaluation            Failed - GFR in the past 12 months        Passed - Microalbumin procedure in past 12 months or taking ACE/ARB             Recent Outpatient Visits              11 months ago Diabetic eye exam (HCC)    [facility], [facility] EwenFrancis Eden, DO    Office Visit    1 year ago Acute low back pain, unspecified back pain laterality, unspecified whether sciatica present    [facility], Virtual Visit Loren Oneill APRN    E-Visit    1 year ago Routine physical examination    [facility], [facility] Francis Eden, DO    Office Visit    3 years ago Routine physical examination    [facility], [facility] [place] Francis Rose, DO    Office Visit

## 2024-07-10 ENCOUNTER — TELEPHONE (OUTPATIENT)
Dept: FAMILY MEDICINE CLINIC | Facility: CLINIC | Age: 39
End: 2024-07-10

## 2024-07-10 NOTE — TELEPHONE ENCOUNTER
Patient called stating his intermittent Family Medical Leave Act is due.  All details the same as previous.  Start date 7/10/24 - 7/10/25.  Patient to fax new form.

## 2024-08-08 NOTE — TELEPHONE ENCOUNTER
Patient called stating he has Family Medical Leave Act forms and plans to submit them in office today.  Informed patient of current turn around time.  Same details as last form completed.  Patient has appointment on 8/27/24 with provider.

## 2024-08-14 ENCOUNTER — HOSPITAL ENCOUNTER (OUTPATIENT)
Age: 39
Discharge: HOME | End: 2024-08-14
Payer: COMMERCIAL

## 2024-08-14 VITALS
TEMPERATURE: 99 F | HEART RATE: 92 BPM | RESPIRATION RATE: 20 BRPM | DIASTOLIC BLOOD PRESSURE: 72 MMHG | OXYGEN SATURATION: 100 % | SYSTOLIC BLOOD PRESSURE: 105 MMHG

## 2024-08-14 DIAGNOSIS — J06.9 VIRAL UPPER RESPIRATORY TRACT INFECTION: Primary | ICD-10-CM

## 2024-08-14 DIAGNOSIS — Z20.822 LAB TEST NEGATIVE FOR COVID-19 VIRUS: ICD-10-CM

## 2024-08-14 DIAGNOSIS — Z20.822 ENCOUNTER FOR LABORATORY TESTING FOR COVID-19 VIRUS: ICD-10-CM

## 2024-08-14 LAB — SARS-COV-2 RNA RESP QL NAA+PROBE: NOT DETECTED

## 2024-08-14 PROCEDURE — U0002 COVID-19 LAB TEST NON-CDC: HCPCS | Performed by: NURSE PRACTITIONER

## 2024-08-14 PROCEDURE — 99213 OFFICE O/P EST LOW 20 MIN: CPT | Performed by: NURSE PRACTITIONER

## 2024-08-14 NOTE — ED PROVIDER NOTES
Patient Seen in: [facility]      History     Chief Complaint   Patient presents with    Cough/URI    Sore Throat     Stated Complaint: sore throat    Subjective:   Well-appearing 39-year-old male with diabetes and hypertension presents with complaints of a nonproductive cough, sore throat and nasal congestion for the past 6 days.  Patient communicates that he was unable to work this past Saturday due to cold symptoms, and is now requesting a work note.  Patient communicates that symptoms have improved.  Patient denies fever or chills.  Patient denies chest pain or shortness of breath.  No over-the-counter medications have been taken for symptoms.        Objective:   History reviewed. No pertinent past medical history.           History reviewed. No pertinent surgical history.             Social History     Socioeconomic History    Marital status: Single   Tobacco Use    Smoking status: Every Day     Types: Cigarettes    Smokeless tobacco: Never   Vaping Use    Vaping status: Never Used   Substance and Sexual Activity    Alcohol use: Yes    Drug use: Not Currently              Review of Systems    Positive for stated Chief Complaint: Cough/URI and Sore Throat    Other systems are as noted in HPI.  Constitutional and vital signs reviewed.      All other systems reviewed and negative except as noted above.    Physical Exam     ED Triage Vitals [08/14/24 1346]   /72   Pulse 103   Resp 20   Temp 98.8 °F (37.1 °C)   Temp src Temporal   SpO2 100 %   O2 Device None (Room air)       Current Vitals:   Vital Signs  BP: 105/72  Pulse: 92  Resp: 20  Temp: 98.8 °F (37.1 °C)  Temp src: Temporal    Oxygen Therapy  SpO2: 100 %  O2 Device: None (Room air)        Physical Exam  VS: Vital signs reviewed. 02 saturation within normal limits for this patient.    General: Patient is awake and alert, oriented to person, place and time. Pt appears non-toxic.     HEENT: Head is normocephalic, atraumatic. Nonicteric  sclera, no conjunctival injection. No facial droop or slurred speech. No oral lesions or pallor. Mucous membranes moist.      Right Ear: Tympanic membrane, ear canal and external ear normal.      Left Ear: Tympanic membrane, ear canal and external ear normal.      Nose: Congestion present. No rhinorrhea.      Mouth/Throat:      Lips: Pink.      Mouth: Mucous membranes are moist.      Pharynx: Oropharynx is clear.     Neck: No cervical lymphadenopathy. Supple. Normal ROM.    Heart: Regular rate and rhythm, normal S1, normal S2.    Lungs: Clear to auscultation. Good inspiratory effort. + Airway entry bilaterally without wheezes, rhonchi or crackles. No accessory muscle use or tachypnea.    Extremities: No focal swelling or tenderness. Capillary refill noted.     Skin: Warm, dry and normal in color.     Psychiatric: Normal affect, judgement normal, insight normal.     CNS: Moves all 4 extremities. Interacts appropriately. No gait abnormality. Memory normal.        ED Course     Labs Reviewed   RAPID SARS-COV-2 BY PCR - Normal     MDM     Medical Decision Making  Well-appearing.  Rapid COVID-19 PCR not detected.  Work note was provided for today, can return tomorrow.  Patient communicates that symptoms are improving, I discussed over-the-counter supportive care as needed.  Close PMD follow-up as well as return precautions discussed.    Problems Addressed:  Encounter for laboratory testing for COVID-19 virus: acute illness or injury  Lab test negative for COVID-19 virus: acute illness or injury  Viral upper respiratory tract infection: acute illness or injury    Amount and/or Complexity of Data Reviewed  Labs: ordered. Decision-making details documented in ED Course.        Disposition and Plan     Clinical Impression:  1. Viral upper respiratory tract infection    2. Encounter for laboratory testing for COVID-19 virus    3. Lab test negative for COVID-19 virus         Disposition:  Discharge  8/14/2024  2:17  pm    Follow-up:  Francis Rose, DO  [address] [facility] [address]  [facility] [address]  [phone]    In 1 week  As needed          Medications Prescribed:  Discharge Medication List as of 8/14/2024  2:19 PM

## 2024-08-22 NOTE — TELEPHONE ENCOUNTER
Patient called, requesting status, informed patient form can be completed after 8/27/24 with provider.  Patient acknowledged.

## 2024-08-27 ENCOUNTER — LAB ENCOUNTER (OUTPATIENT)
Dept: LAB | Age: 39
End: 2024-08-27
Attending: FAMILY MEDICINE
Payer: COMMERCIAL

## 2024-08-27 ENCOUNTER — NURSE ONLY (OUTPATIENT)
Dept: INTERNAL MEDICINE CLINIC | Facility: CLINIC | Age: 39
End: 2024-08-27

## 2024-08-27 DIAGNOSIS — Z00.00 ROUTINE PHYSICAL EXAMINATION: ICD-10-CM

## 2024-08-27 DIAGNOSIS — Z11.3 ROUTINE SCREENING FOR STI (SEXUALLY TRANSMITTED INFECTION): ICD-10-CM

## 2024-08-27 DIAGNOSIS — E11.9 TYPE 2 DIABETES MELLITUS WITHOUT COMPLICATION, WITHOUT LONG-TERM CURRENT USE OF INSULIN (HCC): ICD-10-CM

## 2024-08-27 LAB
ALBUMIN SERPL-MCNC: 4.2 G/DL (ref 3.2–4.8)
ALBUMIN/GLOB SERPL: 1.1 {RATIO} (ref 1–2)
ALP LIVER SERPL-CCNC: 85 U/L
ALT SERPL-CCNC: 45 U/L
ANION GAP SERPL CALC-SCNC: 11 MMOL/L (ref 0–18)
AST SERPL-CCNC: 65 U/L (ref ?–34)
BASOPHILS # BLD AUTO: 0.04 X10(3) UL (ref 0–0.2)
BASOPHILS NFR BLD AUTO: 0.9 %
BILIRUB SERPL-MCNC: 0.6 MG/DL (ref 0.3–1.2)
BILIRUB UR QL: NEGATIVE
BUN BLD-MCNC: 7 MG/DL (ref 9–23)
BUN/CREAT SERPL: 5.4 (ref 10–20)
CALCIUM BLD-MCNC: 9.7 MG/DL (ref 8.7–10.4)
CHLORIDE SERPL-SCNC: 101 MMOL/L (ref 98–112)
CHOLEST SERPL-MCNC: 176 MG/DL (ref ?–200)
CLARITY UR: CLEAR
CO2 SERPL-SCNC: 25 MMOL/L (ref 21–32)
COLOR UR: YELLOW
COMPLEXED PSA SERPL-MCNC: 2.13 NG/ML (ref ?–4)
CREAT BLD-MCNC: 1.3 MG/DL
CREAT UR-SCNC: 172.6 MG/DL
DEPRECATED RDW RBC AUTO: 41 FL (ref 35.1–46.3)
EGFRCR SERPLBLD CKD-EPI 2021: 71 ML/MIN/1.73M2 (ref 60–?)
EOSINOPHIL # BLD AUTO: 0.19 X10(3) UL (ref 0–0.7)
EOSINOPHIL NFR BLD AUTO: 4.1 %
ERYTHROCYTE [DISTWIDTH] IN BLOOD BY AUTOMATED COUNT: 11.9 % (ref 11–15)
FASTING PATIENT LIPID ANSWER: NO
FASTING STATUS PATIENT QL REPORTED: NO
GLOBULIN PLAS-MCNC: 3.7 G/DL (ref 2–3.5)
GLUCOSE BLD-MCNC: 170 MG/DL (ref 70–99)
GLUCOSE UR-MCNC: NORMAL MG/DL
HAV AB SER QL IA: REACTIVE
HAV IGM SER QL: NONREACTIVE
HBV CORE AB SERPL QL IA: NONREACTIVE
HBV SURFACE AB SER QL: REACTIVE
HBV SURFACE AB SERPL IA-ACNC: 27.29 MIU/ML
HBV SURFACE AG SERPL QL IA: NONREACTIVE
HCT VFR BLD AUTO: 40.6 %
HCV AB SERPL QL IA: NONREACTIVE
HDLC SERPL-MCNC: 35 MG/DL (ref 40–59)
HGB BLD-MCNC: 14.7 G/DL
HGB UR QL STRIP.AUTO: NEGATIVE
IMM GRANULOCYTES # BLD AUTO: 0.01 X10(3) UL (ref 0–1)
IMM GRANULOCYTES NFR BLD: 0.2 %
KETONES UR-MCNC: NEGATIVE MG/DL
LDLC SERPL CALC-MCNC: 100 MG/DL (ref ?–100)
LEUKOCYTE ESTERASE UR QL STRIP.AUTO: NEGATIVE
LYMPHOCYTES # BLD AUTO: 1.88 X10(3) UL (ref 1–4)
LYMPHOCYTES NFR BLD AUTO: 40.3 %
MCH RBC QN AUTO: 34.3 PG (ref 26–34)
MCHC RBC AUTO-ENTMCNC: 36.2 G/DL (ref 31–37)
MCV RBC AUTO: 94.6 FL
MICROALBUMIN UR-MCNC: 0.6 MG/DL
MICROALBUMIN/CREAT 24H UR-RTO: 3.5 UG/MG (ref ?–30)
MONOCYTES # BLD AUTO: 0.42 X10(3) UL (ref 0.1–1)
MONOCYTES NFR BLD AUTO: 9 %
NEUTROPHILS # BLD AUTO: 2.12 X10 (3) UL (ref 1.5–7.7)
NEUTROPHILS # BLD AUTO: 2.12 X10(3) UL (ref 1.5–7.7)
NEUTROPHILS NFR BLD AUTO: 45.5 %
NITRITE UR QL STRIP.AUTO: NEGATIVE
NONHDLC SERPL-MCNC: 141 MG/DL (ref ?–130)
OSMOLALITY SERPL CALC.SUM OF ELEC: 286 MOSM/KG (ref 275–295)
PH UR: 5.5 [PH] (ref 5–8)
PLATELET # BLD AUTO: 172 10(3)UL (ref 150–450)
POTASSIUM SERPL-SCNC: 3.4 MMOL/L (ref 3.5–5.1)
PROT SERPL-MCNC: 7.9 G/DL (ref 5.7–8.2)
PROT UR-MCNC: NEGATIVE MG/DL
RBC # BLD AUTO: 4.29 X10(6)UL
SODIUM SERPL-SCNC: 137 MMOL/L (ref 136–145)
SP GR UR STRIP: 1.01 (ref 1–1.03)
T PALLIDUM AB SER QL IA: NONREACTIVE
TRIGL SERPL-MCNC: 239 MG/DL (ref 30–149)
TSI SER-ACNC: 2.46 MIU/ML (ref 0.55–4.78)
UROBILINOGEN UR STRIP-ACNC: 4
VLDLC SERPL CALC-MCNC: 40 MG/DL (ref 0–30)
WBC # BLD AUTO: 4.7 X10(3) UL (ref 4–11)

## 2024-08-27 PROCEDURE — 82043 UR ALBUMIN QUANTITATIVE: CPT

## 2024-08-27 PROCEDURE — 81003 URINALYSIS AUTO W/O SCOPE: CPT

## 2024-08-27 PROCEDURE — 80503 PATH CLIN CONSLTJ SF 5-20: CPT

## 2024-08-27 PROCEDURE — 87591 N.GONORRHOEAE DNA AMP PROB: CPT

## 2024-08-27 PROCEDURE — 86780 TREPONEMA PALLIDUM: CPT

## 2024-08-27 PROCEDURE — 87340 HEPATITIS B SURFACE AG IA: CPT

## 2024-08-27 PROCEDURE — 86704 HEP B CORE ANTIBODY TOTAL: CPT

## 2024-08-27 PROCEDURE — 80061 LIPID PANEL: CPT

## 2024-08-27 PROCEDURE — 80053 COMPREHEN METABOLIC PANEL: CPT

## 2024-08-27 PROCEDURE — 85025 COMPLETE CBC W/AUTO DIFF WBC: CPT

## 2024-08-27 PROCEDURE — 86709 HEPATITIS A IGM ANTIBODY: CPT

## 2024-08-27 PROCEDURE — 87491 CHLMYD TRACH DNA AMP PROBE: CPT

## 2024-08-27 PROCEDURE — 86803 HEPATITIS C AB TEST: CPT

## 2024-08-27 PROCEDURE — 86706 HEP B SURFACE ANTIBODY: CPT

## 2024-08-27 PROCEDURE — 92229 IMG RTA DETC/MNTR DS POC ALY: CPT | Performed by: FAMILY MEDICINE

## 2024-08-27 PROCEDURE — 86708 HEPATITIS A ANTIBODY: CPT

## 2024-08-27 PROCEDURE — 87389 HIV-1 AG W/HIV-1&-2 AB AG IA: CPT

## 2024-08-27 PROCEDURE — 36415 COLL VENOUS BLD VENIPUNCTURE: CPT

## 2024-08-27 PROCEDURE — 84443 ASSAY THYROID STIM HORMONE: CPT

## 2024-08-27 PROCEDURE — 82570 ASSAY OF URINE CREATININE: CPT

## 2024-08-28 LAB
C TRACH DNA SPEC QL NAA+PROBE: NEGATIVE
N GONORRHOEA DNA SPEC QL NAA+PROBE: NEGATIVE

## 2024-09-03 NOTE — TELEPHONE ENCOUNTER
Please try to avoid signing forms in the corner as it is not visible when printing and forms are not accepted this way. Thank you!    Dr. Rose,    **The ACKNOWLEDGE button has been moved to the top right ribbon**    Please sign off on form if you agree to: Family Medical Leave Act recert  1-5 flare ups a month lasting 1 day  1 year  (place your signature on the first page only)  -From your Inbasket, Highlight the patient and click Chart  -Double click the 7/10/24 Forms Completion telephone encounter  -Scroll down to the Media section  -Click the blue Hyperlink: FMLa re-cert Dr. Rose 9/3/24    -Click Acknowledge located in the top right ribbon/menu  -Drag the mouse into the blank space of the document and a + sign will appear. Left click to  electronically sign the document.    Thank you,    Ileana

## 2024-11-07 DIAGNOSIS — I10 ESSENTIAL HYPERTENSION: ICD-10-CM

## 2024-11-11 RX ORDER — LOSARTAN POTASSIUM 100 MG/1
100 TABLET ORAL DAILY
Qty: 90 TABLET | Refills: 3 | Status: SHIPPED | OUTPATIENT
Start: 2024-11-11

## 2024-11-11 RX ORDER — HYDROCHLOROTHIAZIDE 25 MG/1
25 TABLET ORAL DAILY
Qty: 90 TABLET | Refills: 3 | Status: SHIPPED | OUTPATIENT
Start: 2024-11-11

## 2024-11-26 NOTE — PROGRESS NOTES
AI Retinal Exam Result Diabetic Retinopathy Detected: ETDRS level 35 or higher and/or Diabetic Macular Edema Abnormal

## (undated) NOTE — LETTER
Date & Time: 8/14/2024, 2:17 PM  Patient: Aj Bassett  Encounter Provider(s):    Sierra David APRN       To Whom It May Concern:    Aj Bassett was seen and treated in our department on 8/14/2024. He can return to work on 8/15/2024.    If you have any questions or concerns, please do not hesitate to call.      MELISSA Smiley  Physician/APC Signature

## (undated) NOTE — LETTER
2020      [address] [address]         To whom may concern or the supervisor for the above named:    Please be advised that the above named, Mr. Noy Welch, has been under my